# Patient Record
Sex: FEMALE | Race: WHITE | NOT HISPANIC OR LATINO | URBAN - METROPOLITAN AREA
[De-identification: names, ages, dates, MRNs, and addresses within clinical notes are randomized per-mention and may not be internally consistent; named-entity substitution may affect disease eponyms.]

---

## 2021-05-31 ENCOUNTER — RECORDS - HEALTHEAST (OUTPATIENT)
Dept: ADMINISTRATIVE | Facility: CLINIC | Age: 51
End: 2021-05-31

## 2025-07-13 ENCOUNTER — HOSPITAL ENCOUNTER (EMERGENCY)
Facility: CLINIC | Age: 55
Discharge: HOME | End: 2025-07-13
Payer: COMMERCIAL

## 2025-07-13 ENCOUNTER — APPOINTMENT (OUTPATIENT)
Dept: CT IMAGING | Facility: CLINIC | Age: 55
End: 2025-07-13
Payer: COMMERCIAL

## 2025-07-13 VITALS
RESPIRATION RATE: 16 BRPM | WEIGHT: 150 LBS | OXYGEN SATURATION: 100 % | HEART RATE: 58 BPM | SYSTOLIC BLOOD PRESSURE: 175 MMHG | TEMPERATURE: 98.2 F | DIASTOLIC BLOOD PRESSURE: 84 MMHG

## 2025-07-13 DIAGNOSIS — R10.9 LEFT FLANK PAIN: ICD-10-CM

## 2025-07-13 DIAGNOSIS — S29.011A MUSCLE STRAIN OF CHEST WALL, INITIAL ENCOUNTER: ICD-10-CM

## 2025-07-13 DIAGNOSIS — N28.1 RENAL CYST: ICD-10-CM

## 2025-07-13 LAB
ALBUMIN SERPL BCG-MCNC: 3.9 G/DL (ref 3.5–5.2)
ALBUMIN UR-MCNC: NEGATIVE MG/DL
ALP SERPL-CCNC: 73 U/L (ref 40–150)
ALT SERPL W P-5'-P-CCNC: 15 U/L (ref 0–50)
ANION GAP SERPL CALCULATED.3IONS-SCNC: 10 MMOL/L (ref 7–15)
APPEARANCE UR: CLEAR
AST SERPL W P-5'-P-CCNC: 24 U/L (ref 0–45)
BACTERIA #/AREA URNS HPF: ABNORMAL /HPF
BASOPHILS # BLD AUTO: 0.1 10E3/UL (ref 0–0.2)
BASOPHILS NFR BLD AUTO: 1 %
BILIRUB SERPL-MCNC: 0.2 MG/DL
BILIRUB UR QL STRIP: NEGATIVE
BUN SERPL-MCNC: 14.4 MG/DL (ref 6–20)
CALCIUM SERPL-MCNC: 9.5 MG/DL (ref 8.8–10.4)
CHLORIDE SERPL-SCNC: 104 MMOL/L (ref 98–107)
COLOR UR AUTO: YELLOW
CREAT SERPL-MCNC: 0.9 MG/DL (ref 0.51–0.95)
D DIMER PPP FEU-MCNC: 0.33 UG/ML FEU (ref 0–0.5)
EGFRCR SERPLBLD CKD-EPI 2021: 76 ML/MIN/1.73M2
EOSINOPHIL # BLD AUTO: 0.3 10E3/UL (ref 0–0.7)
EOSINOPHIL NFR BLD AUTO: 3 %
ERYTHROCYTE [DISTWIDTH] IN BLOOD BY AUTOMATED COUNT: 12.7 % (ref 10–15)
GLUCOSE SERPL-MCNC: 89 MG/DL (ref 70–99)
GLUCOSE UR STRIP-MCNC: NEGATIVE MG/DL
HCO3 SERPL-SCNC: 25 MMOL/L (ref 22–29)
HCT VFR BLD AUTO: 41.5 % (ref 35–47)
HGB BLD-MCNC: 13.8 G/DL (ref 11.7–15.7)
HGB UR QL STRIP: ABNORMAL
HOLD SPECIMEN: NORMAL
IMM GRANULOCYTES # BLD: 0 10E3/UL
IMM GRANULOCYTES NFR BLD: 0 %
KETONES UR STRIP-MCNC: NEGATIVE MG/DL
LEUKOCYTE ESTERASE UR QL STRIP: ABNORMAL
LIPASE SERPL-CCNC: 29 U/L (ref 13–60)
LYMPHOCYTES # BLD AUTO: 2.7 10E3/UL (ref 0.8–5.3)
LYMPHOCYTES NFR BLD AUTO: 27 %
MCH RBC QN AUTO: 29.7 PG (ref 26.5–33)
MCHC RBC AUTO-ENTMCNC: 33.3 G/DL (ref 31.5–36.5)
MCV RBC AUTO: 89 FL (ref 78–100)
MONOCYTES # BLD AUTO: 0.6 10E3/UL (ref 0–1.3)
MONOCYTES NFR BLD AUTO: 6 %
MUCOUS THREADS #/AREA URNS LPF: PRESENT /LPF
NEUTROPHILS # BLD AUTO: 6.3 10E3/UL (ref 1.6–8.3)
NEUTROPHILS NFR BLD AUTO: 63 %
NITRATE UR QL: NEGATIVE
NRBC # BLD AUTO: 0 10E3/UL
NRBC BLD AUTO-RTO: 0 /100
PH UR STRIP: 5 [PH] (ref 5–7)
PLATELET # BLD AUTO: 349 10E3/UL (ref 150–450)
POTASSIUM SERPL-SCNC: 4 MMOL/L (ref 3.4–5.3)
PROT SERPL-MCNC: 7 G/DL (ref 6.4–8.3)
RBC # BLD AUTO: 4.64 10E6/UL (ref 3.8–5.2)
RBC URINE: 21 /HPF
SODIUM SERPL-SCNC: 139 MMOL/L (ref 135–145)
SP GR UR STRIP: 1.03 (ref 1–1.03)
SQUAMOUS EPITHELIAL: 2 /HPF
UROBILINOGEN UR STRIP-MCNC: NORMAL MG/DL
WBC # BLD AUTO: 9.9 10E3/UL (ref 4–11)
WBC URINE: 5 /HPF

## 2025-07-13 PROCEDURE — 250N000011 HC RX IP 250 OP 636

## 2025-07-13 PROCEDURE — 85379 FIBRIN DEGRADATION QUANT: CPT

## 2025-07-13 PROCEDURE — 250N000013 HC RX MED GY IP 250 OP 250 PS 637

## 2025-07-13 PROCEDURE — 82247 BILIRUBIN TOTAL: CPT

## 2025-07-13 PROCEDURE — 96372 THER/PROPH/DIAG INJ SC/IM: CPT

## 2025-07-13 PROCEDURE — 85004 AUTOMATED DIFF WBC COUNT: CPT

## 2025-07-13 PROCEDURE — 83690 ASSAY OF LIPASE: CPT

## 2025-07-13 PROCEDURE — 81001 URINALYSIS AUTO W/SCOPE: CPT

## 2025-07-13 PROCEDURE — 87086 URINE CULTURE/COLONY COUNT: CPT

## 2025-07-13 PROCEDURE — 36415 COLL VENOUS BLD VENIPUNCTURE: CPT

## 2025-07-13 PROCEDURE — 74177 CT ABD & PELVIS W/CONTRAST: CPT

## 2025-07-13 PROCEDURE — 99285 EMERGENCY DEPT VISIT HI MDM: CPT | Mod: 25

## 2025-07-13 RX ORDER — METHOCARBAMOL 500 MG/1
1000 TABLET, FILM COATED ORAL 3 TIMES DAILY PRN
Qty: 20 TABLET | Refills: 0 | Status: SHIPPED | OUTPATIENT
Start: 2025-07-13

## 2025-07-13 RX ORDER — IOPAMIDOL 755 MG/ML
90 INJECTION, SOLUTION INTRAVASCULAR ONCE
Status: COMPLETED | OUTPATIENT
Start: 2025-07-13 | End: 2025-07-13

## 2025-07-13 RX ORDER — KETOROLAC TROMETHAMINE 15 MG/ML
15 INJECTION, SOLUTION INTRAMUSCULAR; INTRAVENOUS ONCE
Status: COMPLETED | OUTPATIENT
Start: 2025-07-13 | End: 2025-07-13

## 2025-07-13 RX ORDER — LIDOCAINE 4 G/G
1 PATCH TOPICAL ONCE
Status: DISCONTINUED | OUTPATIENT
Start: 2025-07-13 | End: 2025-07-13 | Stop reason: HOSPADM

## 2025-07-13 RX ORDER — LIDOCAINE 4 G/G
1 PATCH TOPICAL EVERY 24 HOURS
Qty: 5 PATCH | Refills: 0 | Status: SHIPPED | OUTPATIENT
Start: 2025-07-13

## 2025-07-13 RX ORDER — METHOCARBAMOL 500 MG/1
1000 TABLET, FILM COATED ORAL ONCE
Status: COMPLETED | OUTPATIENT
Start: 2025-07-13 | End: 2025-07-13

## 2025-07-13 RX ADMIN — LIDOCAINE 1 PATCH: 4 PATCH TOPICAL at 18:21

## 2025-07-13 RX ADMIN — METHOCARBAMOL 1000 MG: 500 TABLET ORAL at 18:20

## 2025-07-13 RX ADMIN — KETOROLAC TROMETHAMINE 15 MG: 15 INJECTION, SOLUTION INTRAMUSCULAR; INTRAVENOUS at 18:30

## 2025-07-13 RX ADMIN — IOPAMIDOL 90 ML: 755 INJECTION, SOLUTION INTRAVENOUS at 19:35

## 2025-07-13 ASSESSMENT — ACTIVITIES OF DAILY LIVING (ADL)
ADLS_ACUITY_SCORE: 41
ADLS_ACUITY_SCORE: 41

## 2025-07-13 ASSESSMENT — COLUMBIA-SUICIDE SEVERITY RATING SCALE - C-SSRS
2. HAVE YOU ACTUALLY HAD ANY THOUGHTS OF KILLING YOURSELF IN THE PAST MONTH?: NO
1. IN THE PAST MONTH, HAVE YOU WISHED YOU WERE DEAD OR WISHED YOU COULD GO TO SLEEP AND NOT WAKE UP?: NO
6. HAVE YOU EVER DONE ANYTHING, STARTED TO DO ANYTHING, OR PREPARED TO DO ANYTHING TO END YOUR LIFE?: NO

## 2025-07-13 NOTE — ED PROVIDER NOTES
"EMERGENCY DEPARTMENT ENCOUNTER      NAME: Luz Dubon  AGE: 54 year old female  YOB: 1970  MRN: [id number]  EVALUATION DATE & TIME: No admission date for patient encounter.    PCP: System, Provider Not In    ED PROVIDER: Zohra Freeman PA-C      Chief Complaint   Patient presents with    Back Pain         FINAL IMPRESSION:  1. Left flank pain    2. Muscle strain of chest wall, initial encounter    3. Renal cyst          ED COURSE & MEDICAL DECISION MAKIN:01 PM Met with patient for initial interview. Plan for care discussed.  9:10 PM Reevaluated and updated patient. I discussed the plan for discharge with the patient, and patient is agreeable. We discussed supportive cares at home and reasons for return to the ER including new or worsening symptoms. All questions and concerns addressed. Patient to be discharged by RN.    54 year old female with a history of chronic low back pain s/p fusion () presents to the Emergency Department for evaluation of left flank pain that started on Wednesday. Patient states pain initially felt \"muscular\", but feels different from her typical low back pain. No associated symptoms or rash noted. However, patient is on oral estrogen and did recently fly to [place] over the . Upon exam, patient is afebrile, hemodynamically stable, and in no acute distress. Mild left CVA tenderness to percussion. Left-sided flank/low back tenderness improved with palpation. No midline CTLS spinal tenderness to palpation or obvious step off. Differential diagnosis includes but not limited to muscle strain/spasm, pancreatitis, pyelonephritis, nephrolithiasis, diverticulitis, electrolyte abnormality, anemia, dehydration, among others. No evidence of cauda equina; therefore, emergent MRI not indicated. Additionally, no midline tenderness/fever/chills or recent procedures to suspect epidural abscess.     CBC without leukocytosis or anemia. CMP WNL. Lipase WNL. UA with " hematuria, few bacteria, few leukocyte esterase and 2 squamous cells that is likely contaminating sample; however, culture added on. Recommend patient have repeat UA performed with PCP and if hematuria persists, recommend she follow-up with urology or nephrology for further evaluation.  CT without acute findings to explain pain; however, New 15 mm cystic lesion anterior to the left upper pole of the kidney is indeterminate by density measurements and could represent a cortical or parapelvic renal cyst - recommend patient follow up with PCP for outpatient left renal US for further characterization, especially given patient's family history of renal cancer (father) and liposarcoma around kidney (mother).    Patient was treated with Toradol, Robaxin, and Lidocaine patch upon arrival with moderate improvement in symptoms. Symptoms and workup most consistent with likely musculoskeletal etiology; however, discussed recommendations as above for renal US. Patient was made aware of the above findings. Plan to discharge patient home with prescription Robaxin, strict return precautions, and close follow up with their PCP for reevaluation and ongoing management - referral placed today. The patient was advised to return to the ER if any new or worsening symptoms develop. The patient verbalizes understanding and agrees with the plan.     MIPS (CTPE, Dental pain, Henao, Sinusitis, Asthma/COPD, Head Trauma): Not Applicable    SEPSIS: None    MEDICATIONS GIVEN IN THE EMERGENCY:  Medications   ketorolac (TORADOL) injection 15 mg (15 mg Intravenous Not Given 7/13/25 1829)   methocarbamol (ROBAXIN) tablet 1,000 mg (1,000 mg Oral $Given 7/13/25 1820)   ketorolac (TORADOL) injection 15 mg (15 mg Intramuscular $Given 7/13/25 1830)   iopamidol (ISOVUE-370) solution 90 mL (90 mLs Intravenous $Given 7/13/25 1935)       NEW PRESCRIPTIONS STARTED AT TODAY'S ER VISIT  Discharge Medication List as of 7/13/2025  9:33 PM        START taking these  "medications    Details   Lidocaine (LIDOCARE) 4 % Patch Place 1 patch over 12 hours onto the skin every 24 hours. To prevent lidocaine toxicity, patient should be patch free for 12 hrs daily.Disp-5 patch, [id number]      methocarbamol (ROBAXIN) 500 MG tablet Take 2 tablets (1,000 mg) by mouth 3 times daily as needed for muscle spasms., Disp-20 tablet, R-0, E-Prescribe                =================================================================    HPI    Patient information was obtained from: aleksandra    Use of : N/A        Luz Dubon is a 54 year old female with a pertinent history of BISI and hypertension who presents to this ED via walk-in for evaluation of back pain.    4 days ago patient began having non-radiating left sided back pain that feels like a muscle strain. Reports it now feels like an \"inward burning\". She does lift objects at work, but does not recall any recent work injury or injury to the area at all. She had a herniated disc in 2003 and reports being used to nerve issues in her spine, and that this does not feel like that. She has had no recent spinal injuries. Reports her father and mother had cancer, and that the first symptom of her mother's cancer was back pain, so she is concerned. She takes daily oral estrogen. She also had a recent flight from [place] 1 week ago. Has been taking ibuprofen every 6 hours for pain, and her last dose was last night. Denies fever, chills, nausea, vomiting, abdominal pain, diarrhea, chest pain, shortness of breath, urinary symptoms, diaphoresis, diet changes, cough, congestion, incontinence, tobacco use, or leg edema.       REVIEW OF SYSTEMS   Review of Systems See HPI    PAST MEDICAL HISTORY:  History reviewed. No pertinent past medical history.    PAST SURGICAL HISTORY:  History reviewed. No pertinent surgical history.        CURRENT MEDICATIONS:    Lidocaine (LIDOCARE) 4 % Patch  methocarbamol (ROBAXIN) 500 MG " tablet        ALLERGIES:  No Known Allergies    FAMILY HISTORY:  History reviewed. No pertinent family history.    SOCIAL HISTORY:   Social History     Socioeconomic History    Marital status:      Social Drivers of Health     Financial Resource Strain: Low Risk  (4/9/2025)    Received from [facility]    Financial Resource Strain     Difficulty of Paying Living Expenses: 3   Food Insecurity: No Food Insecurity (4/9/2025)    Received from [facility]    Food Insecurity     Do you worry your food will run out before you are able to buy more?: 1   Transportation Needs: No Transportation Needs (4/9/2025)    Received from [facility]    Transportation Needs     Does lack of transportation keep you from medical appointments?: 1     Does lack of transportation keep you from work, meetings or getting things that you need?: 1   Social Connections: Socially Integrated (4/9/2025)    Received from [facility]    Social Connections     Do you often feel lonely or isolated from those around you?: 0   Housing Stability: Low Risk  (4/9/2025)    [facility]    Housing Stability     What is your housing situation today?: 1       VITALS:  BP (!) 175/84   Pulse 58   Temp 98.2  F (36.8  C)   Resp 16   Wt 68 kg (150 lb)   SpO2 100%     PHYSICAL EXAM    Constitutional:  Alert, in no acute distress. Cooperative.  EYES: Conjunctivae clear.   HENT:  Atraumatic, normocephalic.  Respiratory:  Respirations even, unlabored, in no acute respiratory distress. Lungs clear to auscultation bilaterally without wheeze, rhonchi, or rales. No cough. Speaks in full sentences easily.  Cardiovascular:  Regular rate and rhythm, good peripheral perfusion.   GI: Soft, flat, non-distended. Bowel sounds normal. Mild left CVA tenderness to percussion. No abdominal tenderness  to palpation. No guarding, rebound, or other peritoneal signs.  Musculoskeletal:  No edema or calf tenderness to palpation. Left-sided flank/low back tenderness improved with palpation. No midline CTLS spinal tenderness to palpation or obvious step off.   Integument: Warm, Dry. No rash to visualized skin or in area of discomfort.  Neurologic:  Alert & oriented. No focal deficits noted.   Psych: Normal mood and affect.      LAB:  All pertinent labs reviewed and interpreted.  Results for orders placed or performed during the hospital encounter of 07/13/25   CT Abdomen Pelvis w Contrast    Impression    IMPRESSION:   1.  No acute abnormality to account for symptoms of left flank pain. No urinary tract calculus or hydronephrosis.  2.  New 15 mm cystic lesion anterior to the left upper pole of the kidney is indeterminate by density measurements and could represent a cortical or parapelvic renal cyst. Consider further characterization with left renal ultrasound.  3.  Scattered air-fluid levels throughout nondilated gastrointestinal tract with no obstruction or inflammatory change. Findings could represent nonspecific gastroenteritis.   Comprehensive metabolic panel   Result Value Ref Range    Sodium 139 135 - 145 mmol/L    Potassium 4.0 3.4 - 5.3 mmol/L    Carbon Dioxide (CO2) 25 22 - 29 mmol/L    Anion Gap 10 7 - 15 mmol/L    Urea Nitrogen 14.4 6.0 - 20.0 mg/dL    Creatinine 0.90 0.51 - 0.95 mg/dL    GFR Estimate 76 >60 mL/min/1.73m2    Calcium 9.5 8.8 - 10.4 mg/dL    Chloride 104 98 - 107 mmol/L    Glucose 89 70 - 99 mg/dL    Alkaline Phosphatase 73 40 - 150 U/L    AST 24 0 - 45 U/L    ALT 15 0 - 50 U/L    Protein Total 7.0 6.4 - 8.3 g/dL    Albumin 3.9 3.5 - 5.2 g/dL    Bilirubin Total 0.2 <=1.2 mg/dL   Result Value Ref Range    Lipase 29 13 - 60 U/L   UA with Microscopic reflex to Culture    Specimen: Urine, Midstream   Result Value Ref Range    Color Urine Yellow Colorless, Straw, Light Yellow, Yellow    Appearance  Urine Clear Clear    Glucose Urine Negative Negative mg/dL    Bilirubin Urine Negative Negative    Ketones Urine Negative Negative mg/dL    Specific Gravity Urine 1.030 1.001 - 1.030    Blood Urine 0.1 mg/dL (A) Negative    pH Urine 5.0 5.0 - 7.0    Protein Albumin Urine Negative Negative mg/dL    Urobilinogen Urine Normal Normal mg/dL    Nitrite Urine Negative Negative    Leukocyte Esterase Urine 25 Lynn/uL (A) Negative    Bacteria Urine Few (A) None Seen /HPF    Mucus Urine Present (A) None Seen /LPF    RBC Urine 21 (H) <=2 /HPF    WBC Urine 5 <=5 /HPF    Squamous Epithelials Urine 2 (H) <=1 /HPF   D dimer quantitative   Result Value Ref Range    D-Dimer Quantitative 0.33 0.00 - 0.50 ug/mL FEU   CBC with platelets and differential   Result Value Ref Range    WBC Count 9.9 4.0 - 11.0 10e3/uL    RBC Count 4.64 3.80 - 5.20 10e6/uL    Hemoglobin 13.8 11.7 - 15.7 g/dL    Hematocrit 41.5 35.0 - 47.0 %    MCV 89 78 - 100 fL    MCH 29.7 26.5 - 33.0 pg    MCHC 33.3 31.5 - 36.5 g/dL    RDW 12.7 10.0 - 15.0 %    Platelet Count 349 150 - 450 10e3/uL    % Neutrophils 63 %    % Lymphocytes 27 %    % Monocytes 6 %    % Eosinophils 3 %    % Basophils 1 %    % Immature Granulocytes 0 %    NRBCs per 100 WBC 0 <1 /100    Absolute Neutrophils 6.3 1.6 - 8.3 10e3/uL    Absolute Lymphocytes 2.7 0.8 - 5.3 10e3/uL    Absolute Monocytes 0.6 0.0 - 1.3 10e3/uL    Absolute Eosinophils 0.3 0.0 - 0.7 10e3/uL    Absolute Basophils 0.1 0.0 - 0.2 10e3/uL    Absolute Immature Granulocytes 0.0 <=0.4 10e3/uL    Absolute NRBCs 0.0 10e3/uL   Extra Green Top (Lithium Heparin) Tube   Result Value Ref Range    Hold Specimen [facility]        RADIOLOGY:  Reviewed all pertinent imaging. Please see official radiology report.  CT Abdomen Pelvis w Contrast   Final Result   IMPRESSION:    1.  No acute abnormality to account for symptoms of left flank pain. No urinary tract calculus or hydronephrosis.   2.  New 15 mm cystic lesion anterior to the left upper pole of the  kidney is indeterminate by density measurements and could represent a cortical or parapelvic renal cyst. Consider further characterization with left renal ultrasound.   3.  Scattered air-fluid levels throughout nondilated gastrointestinal tract with no obstruction or inflammatory change. Findings could represent nonspecific gastroenteritis.        I, Ender Stiles, am serving as a scribe to document services personally performed by Zohra Freeman PA-C based on my observation and the provider's statements to me. I, Zohra Freeman PA-C, attest that Ender Stiles is acting in a scribe capacity, has observed my performance of the services and has documented them in accordance with my direction.    Zohra Freeman PA-C  [facility] EMERGENCY ROOM  [address] [address] [phone]      Zohra Freeman PA-C  07/14/25 0009

## 2025-07-13 NOTE — ED NOTES
Attempted to gain IV access x2, nilaimer ordered, sent blood, spoke to sheyla العلي to send labs and give IM Toradol, will get results and see what testing is needed further and then access IV. Patient verbalized agreement to plan.

## 2025-07-13 NOTE — ED TRIAGE NOTES
Patient ambulatory into triage reporting left lower back pain. Patient reports the pain started a few days ago, denies injury. Denies urinary symptoms. Patient states she has hx of back issues, but this feels different than her sciatica.

## 2025-07-14 NOTE — DISCHARGE INSTRUCTIONS
Rest, ice/heat, and increase activity as tolerates. You may use topical Icy Hot or Lidoderm as needed. You may use ibuprofen for swelling/pain and Tylenol as needed for pain. You may use Robaxin, muscle relaxer, as needed for spasm. You may use Lidocaine patch as needed - only keep in place for 12 hrs out of every 24 hrs (leaving this in place longer than 12 hours can cause toxicity).     Follow up with your primary care provider for repeat urinalysis as well as renal ultrasound to further evaluation renal cyst and ensure blood in your urine has resolved, if still present, consider follow up with urology/nephrology for further evaluation.    Tylenol (Acetaminophen) Discharge Instructions:  You may take over-the-counter, Tylenol (acetaminophen) every 4-6 hours as needed for pain.  Take no more than 4000 mg of Tylenol in a 24-hour period for adults, maximum dosing for children is based on weight - please look at medication label.    Avoid taking more than 1 acetaminophen-containing product at a time and be aware that many over-the-counter medications contain a combination of acetaminophen and other products.  If you are taking Tylenol in addition to a combination product please keep track of your daily acetaminophen dose to make sure you do not exceed the recommended 4000 mg.  Taking too much acetaminophen can cause permanent damage to your liver.    Ibuprofen/Naproxen Discharge Instructions:  You may take ibuprofen (600 mg) every 6 hours as needed for pain control.  The maximum dose of (ibuprofen is 3200 mg ) in a 24-hour period for adults, maximum dosing for children is based on weight - please look at medication label.     Take this medication with food to prevent stomach irritation.  With long-term use this medication can irritate the stomach causing pain and lead to development of a stomach ulcer.  If you notice stomach pain or vomiting of coffee-ground colored vomit or blood, please be seen by a healthcare  provider.  Attempt to use this medication for the shortest time possible.      Follow-up with your primary care provider and/or orthopedics for reevaluation and possible additional imaging, joint steroid injection, and/or physical therapy referral.     Return to the emergency department for any new or worsening symptoms including increased pain, redness/warmth/drainage/swelling, fever/chills, nausea/vomiting, diarrhea, bloody stools, blood in your urine, painful urination, or any other concerning symptoms.     Take Care!  - Zohra Freeman PA-C

## 2025-07-15 LAB — BACTERIA UR CULT: NORMAL
